# Patient Record
Sex: MALE | Race: ASIAN | Employment: UNEMPLOYED | URBAN - METROPOLITAN AREA
[De-identification: names, ages, dates, MRNs, and addresses within clinical notes are randomized per-mention and may not be internally consistent; named-entity substitution may affect disease eponyms.]

---

## 2020-03-25 PROBLEM — E78.5 DYSLIPIDEMIA: Status: RESOLVED | Noted: 2020-03-25 | Resolved: 2020-03-24

## 2020-11-18 ENCOUNTER — HOSPITAL ENCOUNTER (OUTPATIENT)
Age: 60
Discharge: HOME | End: 2020-11-20
Payer: COMMERCIAL

## 2020-11-18 ENCOUNTER — HOSPITAL ENCOUNTER (OUTPATIENT)
Dept: GENERAL RADIOLOGY | Age: 60
Discharge: HOME | End: 2020-11-20
Payer: COMMERCIAL

## 2020-11-18 PROCEDURE — 73030 X-RAY EXAM OF SHOULDER: CPT

## 2021-01-29 ENCOUNTER — TELEPHONE (OUTPATIENT)
Dept: CARDIOLOGY | Age: 61
End: 2021-01-29

## 2021-01-29 NOTE — TELEPHONE ENCOUNTER
Message left on patients home number reminding of echo @ 9:00 am on 2-1 @ [facility] with stress to follow at 10:30 am Reviewed covid instructions as well.  Ashok Camilo @ [phone], message left as well with instructions

## 2021-02-01 ENCOUNTER — HOSPITAL ENCOUNTER (OUTPATIENT)
Dept: CARDIOLOGY | Age: 61
Discharge: HOME | End: 2021-02-01
Payer: COMMERCIAL

## 2021-02-01 VITALS
HEART RATE: 65 BPM | BODY MASS INDEX: 26.53 KG/M2 | RESPIRATION RATE: 20 BRPM | DIASTOLIC BLOOD PRESSURE: 80 MMHG | HEIGHT: 67 IN | OXYGEN SATURATION: 99 % | SYSTOLIC BLOOD PRESSURE: 122 MMHG | TEMPERATURE: 97.5 F | WEIGHT: 169 LBS

## 2021-02-01 LAB
LV EF: 55 %
LV EF: 55 %
LVEF MODALITY: NORMAL
LVEF MODALITY: NORMAL

## 2021-02-01 PROCEDURE — 2580000003 HC RX 258: Performed by: INTERNAL MEDICINE

## 2021-02-01 PROCEDURE — 93306 TTE W/DOPPLER COMPLETE: CPT

## 2021-02-01 PROCEDURE — 93017 CV STRESS TEST TRACING ONLY: CPT

## 2021-02-01 PROCEDURE — 78452 HT MUSCLE IMAGE SPECT MULT: CPT

## 2021-02-01 PROCEDURE — 3430000000 HC RX DIAGNOSTIC RADIOPHARMACEUTICAL: Performed by: INTERNAL MEDICINE

## 2021-02-01 PROCEDURE — A9502 TC99M TETROFOSMIN: HCPCS | Performed by: INTERNAL MEDICINE

## 2021-02-01 RX ORDER — SODIUM CHLORIDE 0.9 % (FLUSH) 0.9 %
10 SYRINGE (ML) INJECTION PRN
Status: DISCONTINUED | OUTPATIENT
Start: 2021-02-01 | End: 2021-02-02 | Stop reason: HOSPADM

## 2021-02-01 RX ADMIN — SODIUM CHLORIDE, PRESERVATIVE FREE 10 ML: 5 INJECTION INTRAVENOUS at 10:17

## 2021-02-01 RX ADMIN — TETROFOSMIN 9.3 MILLICURIE: 0.23 INJECTION, POWDER, LYOPHILIZED, FOR SOLUTION INTRAVENOUS at 10:17

## 2021-02-01 RX ADMIN — TETROFOSMIN 27.5 MILLICURIE: 0.23 INJECTION, POWDER, LYOPHILIZED, FOR SOLUTION INTRAVENOUS at 11:27

## 2021-02-01 RX ADMIN — SODIUM CHLORIDE, PRESERVATIVE FREE 10 ML: 5 INJECTION INTRAVENOUS at 11:27

## 2021-02-01 NOTE — PROCEDURES
[id number] - [address]. Tae hyman, [address] [place], [place] [phone]                  Exercise Stress Nuclear Gated SPECT Study    Name: [facility] Account Number: [de-identified]    :  1960      Sex: male              Date of Study:  2021    Height: 5' 7\" (170.2 cm)  Weight: 169 lb (76.7 kg)     Ordering Provider: Radha Ding          PCP: Suzan Watts MD      Cardiologist: Melanie Raygoza                        Interpreting Physician: Carlos Pope  _________________________________________________________________________________    Indication:   Evaluation of extent and severity of coronary artery disease    Clinical History:   Patient has prior history of coronary artery disease. Resting ECG:    SR with rightward axis and with non specific T wave changes    Exercise: The patient exercised using a Chente protocol, completing 9:00 minutes and reaching an estimated work load of 07.0 metabolic equivalents (METS). Resting HR was 71. Peak exercise heart rate was 138 ( 87% of maximum predicted heart rate for age). Baseline /80. Peak exercise /80. The blood pressure response to exercise was normal      Exercise was terminated due to heart rate attained. The patient experienced no chest pain with exercise. Pulse oximetry was used to monitor oxygen saturation during the stress test.  The study was performed on Room Air. The resting pulse oximeter was 99%. The post stress O2 saturation seen during exercise was 98 %. Exercise ECG:   The patient demonstrated occasional PVC's during exercise. With exercise, there were no ST segment changes of significance at the heart rate achieved. IMAGING: Myocardial perfusion imaging was performed at rest 30-35 minutes following the intravenous injection of 9.3 mCi of (Tc-tetrofosmin) followed by 10 ml of Normal Saline.   At peak exercise, the patient was injected intravenously with 27.5 mCi of (Tc-tetrofosmin) followed by 10 ml of Normal Saline. Gated post-stress tomographic imaging was performed 20-25 minutes after stress. FINDINGS: The overall quality of the study was good. Left ventricular cavity size was noted to be mildly enlarged on both rest and stress studies. Rotational analog analysis demonstrated abnormal patient motion. A mild defect was present in the mid to basal anterolateral wall(s) that was  small to moderate in size on the post stress images. There also was a mild defect present in the basal inferior wall(s) that was also  small to moderate size. The resting images are show no change. Gated SPECT left ventricular ejection fraction was calculated to be 55%, with no gross regional wall motion abnormality      Impression:    1. Exercise EKG was normal.  2. The patient experienced no chest pain with exercise. 3. The myocardial perfusion imaging was probably normal with attenuation artifact ( a non transmural MI involving the mid to basal anterolateral wall and a non transmural MI involving the basal inferior wall cannot be totally excluded but with no evidence of residual stress induced ischemia ). 4. Overall left ventricular systolic function was normal without regional wall motion abnormalities. 5. Exercise capacity was above average. 6. Low risk general exercise nuclear stress test.    Thank you for sending your patient to this [facility].      Electronically signed by Mary Nguyen MD on 2/1/2021 at 4:01 PM

## 2023-10-20 PROBLEM — R49.0 DYSPHONIA: Status: ACTIVE | Noted: 2023-10-20

## 2023-10-20 PROBLEM — E11.9 DIABETES (MULTI): Status: ACTIVE | Noted: 2023-10-20

## 2023-10-20 PROBLEM — J38.3 VOCAL CORD GRANULOMA: Status: ACTIVE | Noted: 2023-10-20

## 2023-10-20 PROBLEM — I10 HTN (HYPERTENSION): Status: ACTIVE | Noted: 2023-10-20

## 2023-10-20 RX ORDER — NAPROXEN SODIUM 220 MG/1
TABLET, FILM COATED ORAL
COMMUNITY
Start: 2019-02-05

## 2023-10-20 RX ORDER — METFORMIN HYDROCHLORIDE 1000 MG/1
TABLET ORAL
COMMUNITY

## 2023-10-20 RX ORDER — LISINOPRIL 30 MG/1
TABLET ORAL
COMMUNITY

## 2023-10-20 RX ORDER — TRAZODONE HYDROCHLORIDE 300 MG/1
TABLET ORAL
COMMUNITY
End: 2023-10-23 | Stop reason: HOSPADM

## 2023-10-23 ENCOUNTER — OFFICE VISIT (OUTPATIENT)
Dept: OTOLARYNGOLOGY | Facility: HOSPITAL | Age: 63
End: 2023-10-23
Payer: COMMERCIAL

## 2023-10-23 VITALS — WEIGHT: 166.9 LBS | HEIGHT: 67 IN | TEMPERATURE: 96.8 F | BODY MASS INDEX: 26.19 KG/M2

## 2023-10-23 DIAGNOSIS — J38.3 VOCAL CORD GRANULOMA: ICD-10-CM

## 2023-10-23 DIAGNOSIS — R09.A2 GLOBUS PHARYNGEUS: ICD-10-CM

## 2023-10-23 DIAGNOSIS — K21.9 GERD WITHOUT ESOPHAGITIS: ICD-10-CM

## 2023-10-23 DIAGNOSIS — R13.19 ESOPHAGEAL DYSPHAGIA: ICD-10-CM

## 2023-10-23 DIAGNOSIS — K21.9 LARYNGOPHARYNGEAL REFLUX: ICD-10-CM

## 2023-10-23 DIAGNOSIS — R49.0 DYSPHONIA: Primary | ICD-10-CM

## 2023-10-23 PROCEDURE — 4010F ACE/ARB THERAPY RXD/TAKEN: CPT | Performed by: OTOLARYNGOLOGY

## 2023-10-23 PROCEDURE — 99214 OFFICE O/P EST MOD 30 MIN: CPT | Performed by: OTOLARYNGOLOGY

## 2023-10-23 PROCEDURE — 31579 LARYNGOSCOPY TELESCOPIC: CPT | Performed by: OTOLARYNGOLOGY

## 2023-10-23 PROCEDURE — 1036F TOBACCO NON-USER: CPT | Performed by: OTOLARYNGOLOGY

## 2023-10-23 RX ORDER — MAGNESIUM CARB/ALUMINUM HYDROX 105-160MG
TABLET,CHEWABLE ORAL
Qty: 100 TABLET | Refills: 2 | Status: SHIPPED | OUTPATIENT
Start: 2023-10-23

## 2023-10-23 RX ORDER — INSULIN DEGLUDEC 100 U/ML
14 INJECTION, SOLUTION SUBCUTANEOUS
COMMUNITY
Start: 2023-03-10 | End: 2024-03-09

## 2023-10-23 RX ORDER — TRAZODONE HYDROCHLORIDE 50 MG/1
50 TABLET ORAL NIGHTLY
COMMUNITY
Start: 2023-10-22

## 2023-10-23 RX ORDER — METOPROLOL TARTRATE 50 MG/1
50 TABLET ORAL 2 TIMES DAILY
COMMUNITY
Start: 2007-02-08

## 2023-10-23 RX ORDER — ROSUVASTATIN CALCIUM 40 MG/1
40 TABLET, COATED ORAL
COMMUNITY
Start: 2023-08-30

## 2023-10-23 RX ORDER — NITROGLYCERIN 0.4 MG/1
0.4 TABLET SUBLINGUAL EVERY 5 MIN PRN
COMMUNITY
Start: 2006-09-05

## 2023-10-23 RX ORDER — OMEPRAZOLE 20 MG/1
20 CAPSULE, DELAYED RELEASE ORAL 2 TIMES DAILY
Qty: 90 CAPSULE | Refills: 3 | Status: SHIPPED | OUTPATIENT
Start: 2023-10-23 | End: 2024-10-22

## 2023-10-23 ASSESSMENT — ENCOUNTER SYMPTOMS
OCCASIONAL FEELINGS OF UNSTEADINESS: 0
LOSS OF SENSATION IN FEET: 0
DEPRESSION: 0

## 2023-10-23 ASSESSMENT — PAIN SCALES - GENERAL: PAINLEVEL: 2

## 2023-10-23 NOTE — PROGRESS NOTES
ASSESSMENT AND PLAN:   Natividad Strong is a 63 y.o. male presenting for an initial visit with findings of reflux related changes in the pharynx.  Has significant increase in reflux finding score.  The patient notes symptoms of regurgitation as well as acid sensation within the throat and abdomen.  Denies any concerning features regarding the the voice but does have a mild right vocal cord weakness.  Has some mild muscle tension dysphonia.      Assessment/Plan     Plan for adding omeprazole 40 mg twice daily as well as Gaviscon after meals and at bedtime for reflux related changes.  The patient is interested in undergoing an EGD by GI to evaluate his abdominal symptoms.         Reason For Consult  No chief complaint on file.         HISTORY OF PRESENT ILLNESS:  Natividad Strong is a 63 y.o. male presenting for an initial visit with me for throat irritation and abdominal discomfort concerning for reflux.  The patient reports the symptoms been relatively new over the past few months.  Occasionally has voice related changes.  No hemoptysis or other concerns.      Past Medical History  He has no past medical history on file. Surgical History  He has no past surgical history on file.   Social History  He has no history on file for tobacco use, alcohol use, and drug use. Allergies  Patient has no known allergies.     Family History  No family history on file.     Review of Systems  All 10 systems were reviewed and negative except for above.      Physical Exam    ENT Physical Exam  Constitutional  Appearance: patient appears well-developed and well-nourished,  Head and Face  Appearance: head appears normal and face appears normal;  Ear  Auricles: right auricle normal; left auricle normal;  Nose  External Nose: nares patent bilaterally;  Oral Cavity/Oropharynx  Lips: normal;  Neck  Neck: neck normal; neck palpation normal;  Respiratory  Inspection: no retractions visible;  Cardiovascular  Inspection: no peripheral edema  present;  Neurovestibular  Mental Status: alert and oriented;  Psychiatric: mood normal;  Cranial Nerves: cranial nerves intact;      Last Recorded Vitals  There were no vitals taken for this visit.    Relevant Results       Patient Reported Outcome Measures         Radiology, Laboratory and Pathology  No results found.      ----------------------------------------------------------------------  Laryngoscopy    Date/Time: 10/23/2023 2:53 PM    Performed by: Dl Ly MD  Authorized by: Dl Ly MD    Consent:     Consent obtained:  Verbal    Consent given by:  Patient  Anesthesia (see MAR for exact dosages):     Anesthesia method:  Topical application  Procedure details:     Indications: assessment of airway and evaluation of larynx and immediate subglottis      Medication:  Afrin    Instrument: flexible fiberoptic laryngoscope      Scope location: bilateral nare    Post-procedure details:     Patient tolerance of procedure:  Tolerated well, no immediate complications   GRBAS: 1.1.0.0.1  Intelligibility, normal resonance balanced vocal loudness normal breath support normal    Subglottic edema is present thick mucus is present in [place] is absent ventricular obliteration is partial erythema is arytenoids interarytenoid thickening is mild vocal fold edema is none diffuse laryngitis is none.  Gross arytenoids are limited motion on the right.  Arytenoid height the right is slightly low, she would like tension is lateral.  Sensory is normal, amplitude is normal, in phase and aperiodic, no restriction of mucosal wave.  Closed glottis    Time Spent  Prep time on day of patient encounter: 10 minutes  Time spent directly with patient, family or caregiver: 25 minutes  Additional Time Spent on Patient Care Activities: 5 minutes  Documentation Time: 10 minutes  Other Time Spent: 0 minutes  Total: 50 minutes

## 2024-01-16 ENCOUNTER — APPOINTMENT (OUTPATIENT)
Dept: CARDIOLOGY | Facility: CLINIC | Age: 64
End: 2024-01-16
Payer: COMMERCIAL

## 2024-01-16 PROBLEM — E78.00 HYPERCHOLESTEREMIA: Status: ACTIVE | Noted: 2024-01-16

## 2024-01-16 PROBLEM — I25.10 CORONARY ARTERY DISEASE INVOLVING NATIVE CORONARY ARTERY OF NATIVE HEART: Status: ACTIVE | Noted: 2024-01-16

## 2024-01-16 PROBLEM — Z95.1 HISTORY OF CORONARY ARTERY BYPASS GRAFT X 3: Status: ACTIVE | Noted: 2024-01-16
